# Patient Record
(demographics unavailable — no encounter records)

---

## 2024-11-22 NOTE — SURGICAL HISTORY
[TextEntry] : 12/12/23 right breast lumpectomy  10/24/23 right breast lumpectomy angiogram in 2013

## 2024-11-22 NOTE — HISTORY OF PRESENT ILLNESS
[FreeTextEntry1] : 78 year old female, patient of Dr. Hernandez, presents for follow up evaluation regarding R DCIS ER % [place] 5%. Patient is s/p right breast lumpectomy on 10/24/23 at [facility], path revealed 4.5cm DCIS intermediate grade with necrosis, focally involving a radial scar; LCIS classic type, biopsy site changes; with positive medial margin (DCIS present at inked margin). Patient is s/p re-excision of medial margin 12/12/23 at [facility], path revealed 3.6cm residual DCIS, intermediate grade with necrosis and rare microcalcification; however DCIS focally involving inked new margin on one slide, <1 mm in two slides and tunica media vascular calcifications. Outside slide review complete, no change.  DCISionRT score 9.2. Patient s/p XRT (4/24/24) with rad onc, Dr. Hernandez. Currently taking Tamoxifen with med onc, Dr. Ordonez (initially on Arimidex however d/c d/t osteoporosis).  B/l dMMG/US 11/6/24, BI-RADS 2.  Cancer Hx: First seen on screening mammogram, s/p stereotactic biopsy x 2 sites of R breast, followed by B/L MRI for extent of disease which prompted 2 more biopsies of the R breast. Summary of biopsies below:   8/15/23 - Stereotactic biopsy x 2 sites at [facility] Site A - RIGHT lower central with calcifications: DCIS; intermediate to high nuclear grade, solid and cribriform types; LCIS in situ, classic type; Calcification associated with carcinoma in situ and non-neoplastic tissue Site B - RIGHT lower central without calcifications: DCIS, similar to that in part "A"; portions of papilloma involved by DCIS  9/22/23 - MRI guided biopsy x 2 sites at [facility] Site A - 0.4cm focal NME of the RIGHT 6-7:00 1.3cmfn (cylinder clip) - DCIS, focal, solid pattern, intermediate nuclear grade; [facility] Site B - anterior most aspect of the RIGHT 6:00 segmental 4cm NME (biopsy proven DCIS and LCIS) (buckle clip) - DCIS, focal, solid pattern, intermediate nuclear grade.  Patient denies family history of breast or ovarian cancer.

## 2024-11-22 NOTE — DATA REVIEWED
[FreeTextEntry1] : 7/10/23 ([facility]) B/L sMMG/US: heterogeneously dense. There is an area of developing density/irregular mass with associated microcalcifications in the right breast 12-1:00 position approximately 6 cm deep to nipple. FOLLOW-UP: focal spot compression, ML view and directed breast ultrasound as no abnormality was identified at screening breast ultrasound in this region. BI-RADS 0:  Incomplete.  8/1/23 ([facility]) R DX MMG/US: heterogeneously dense. There is persistent right lower central middle breast focal asymmetry with associated coarse heterogeneous calcifications. Sonographically occult right breast focal asymmetry is indeterminate and tissue sampling is recommended. FOLLOW-UP: Stereotactic/tomosynthesis guided core biopsy of the right breast is recommended. BI-RADS 4:  Suspicious.  8/15/23 Stereotactic biopsy x 2 sites  ([facility])  Site A - RIGHT lower central with calcifications: DCIS; intermediate to high nuclear grade, solid and cribriform types; LCIS in situ, classic type; Calcification associated with carcinoma in situ and non neoplastic tissue Site B - RIGHT lower central without calcifications: DCIS, similar to that in part "A"; portions of papilloma involved by DCIS ER % [place] 5% Malignant, concordant. RECOMMENDATION: Surgical consultation   9/11/23 ([facility]) B/L MRI:  1. RIGHT 6:00 segmental NME measuring up to 4cm in dimension, with the biopsy clip in the posterior and lateral portion of the suspicious enhancement. MRI guided biopsy is recommended of the anterior extent if it would change clinical management 2. Focal NME at RIGHT 6-7:00 anterior depth, 1.7cm anterior to the biopsy-proven DCIS, is suspicious for malignancy. As there is no mammographic correlate, MRI guided biopsy is recommended 3. No suspicious LEFT breast enhancement. No axillay or internal mammary adenopathy.  RECOMMENDATION: Surgical/oncologic management is recommended and ongoing. Biopsy of the RIGHT breast is recommended. BIRADS-4: Suspicious   9/22/23 ([facility]) MRI guided biopsy x 2 sites Site A - 0.4cm focal NME of the RIGHT 6-7:00 1.3cmfn (cylinder clip) - DCIS, focal, solid pattern, intermediate nuclear grade; [facility] Site B - anterior most aspect of the RIGHT 6:00 segmental 4cm NME (biopsy proven DCIS and LCIS) (buckle clip) - DCIS, focal, solid pattern, intermediate nuclear grade. Malignant, concordant. Surgical consultation is ongoing. ADDENDUM: The cylinder and buckle clips are amenable to elvis localization. The tophat clip from outside stereobiopsy is not amenable to elvis.   10/24/23 ([facility])  RIGHT LUMPECTOMY: DCIS, intermediate garde with necrosis, spanning at least 45mm, focally involving a radial scar. LCIS, classic type. Biopsy site changes. RIGHT BREAST LATERAL & SUPERIOR & INFERIOR & DEEP MARGINS: Bengin breast tissue RIGHT BREAST MEIAL MARGIN: DCIS present at the new inked margin RIGHT BREAST ANTERIOR MARGIN: Skin with dermal scar  12/12/23 ([facility])  RE-EXCISION OF RIGHT BREAST MEDIAL MARGIN: Residual DCIS, intermediate grade with necrosis and rare microcalcification, spans approximately 3.6cm.  NEW MARGIN DCIS focally involves inked new margin on one slide, <1mm in two slides. Tunica media vascular calcifications   2/27/24 ([facility]) Outside slide review surg path: Right breast lumpectomy re-excision medial margin: DCIS 3.6 cm intermediate grade, solid type with associated necrosis, present in 1 focus at inked margin and at biopsy site changes, <1mm in two slides, and vascular calcifications.  11/6/24 ([facility]) B/l dMMG/US- No mammographic or sonographic evidence of malignancy. Post lumpectomy changes in the right breast with marked distortion. BI-RADS 2

## 2024-11-22 NOTE — PHYSICAL EXAM
[Supple] : supple [No Supraclavicular Adenopathy] : no supraclavicular adenopathy [Examined in the supine and seated position] : examined in the supine and seated position [No dominant masses] : no dominant masses in right breast  [No dominant masses] : no dominant masses left breast [No Nipple Retraction] : no left nipple retraction [No Nipple Discharge] : no left nipple discharge [No Axillary Lymphadenopathy] : no left axillary lymphadenopathy [de-identified] : anti-radial incision c/d/i; flattened nipple

## 2024-11-22 NOTE — REASON FOR VISIT
[Follow-Up: _____] : a [unfilled] follow-up visit [FreeTextEntry1] : R DCIS s/p lumpectomy s/p re-excision, positive margin

## 2024-11-22 NOTE — PAST MEDICAL HISTORY
[Menarche Age ____] : age at menarche was [unfilled] [History of Hormone Replacement Treatment] : has a history of hormone replacement treatment [Total Preg ___] : G[unfilled] [Live Births ___] : P[unfilled]  [FreeTextEntry2] : miscarriage  [FreeTextEntry6] : No [FreeTextEntry7] : No [FreeTextEntry8] : N/A

## 2024-11-22 NOTE — DATA REVIEWED
[FreeTextEntry1] : 7/10/23 ([facility]) B/L sMMG/US: heterogeneously dense. There is an area of developing density/irregular mass with associated microcalcifications in the right breast 12-1:00 position approximately 6 cm deep to nipple. FOLLOW-UP: focal spot compression, ML view and directed breast ultrasound as no abnormality was identified at screening breast ultrasound in this region. BI-RADS 0:  Incomplete.  8/1/23 ([facility]) R DX MMG/US: heterogeneously dense. There is persistent right lower central middle breast focal asymmetry with associated coarse heterogeneous calcifications. Sonographically occult right breast focal asymmetry is indeterminate and tissue sampling is recommended. FOLLOW-UP: Stereotactic/tomosynthesis guided core biopsy of the right breast is recommended. BI-RADS 4:  Suspicious.  8/15/23 Stereotactic biopsy x 2 sites  ([facility])  Site A - RIGHT lower central with calcifications: DCIS; intermediate to high nuclear grade, solid and cribriform types; LCIS in situ, classic type; Calcification associated with carcinoma in situ and non neoplastic tissue Site B - RIGHT lower central without calcifications: DCIS, similar to that in part "A"; portions of papilloma involved by DCIS ER % [place] 5% Malignant, concordant. RECOMMENDATION: Surgical consultation   9/11/23 ([facility]) B/L MRI:  1. RIGHT 6:00 segmental NME measuring up to 4cm in dimension, with the biopsy clip in the posterior and lateral portion of the suspicious enhancement. MRI guided biopsy is recommended of the anterior extent if it would change clinical management 2. Focal NME at RIGHT 6-7:00 anterior depth, 1.7cm anterior to the biopsy-proven DCIS, is suspicious for malignancy. As there is no mammographic correlate, MRI guided biopsy is recommended 3. No suspicious LEFT breast enhancement. No axillay or internal mammary adenopathy.  RECOMMENDATION: Surgical/oncologic management is recommended and ongoing. Biopsy of the RIGHT breast is recommended. BIRADS-4: Suspicious   9/22/23 ([facility]) MRI guided biopsy x 2 sites Site A - 0.4cm focal NME of the RIGHT 6-7:00 1.3cmfn (cylinder clip) - DCIS, focal, solid pattern, intermediate nuclear grade; [facility] Site B - anterior most aspect of the RIGHT 6:00 segmental 4cm NME (biopsy proven DCIS and LCIS) (buckle clip) - DCIS, focal, solid pattern, intermediate nuclear grade. Malignant, concordant. Surgical consultation is ongoing. ADDENDUM: The cylinder and buckle clips are amenable to elvis localization. The tophat clip from outside stereobiopsy is not amenable to elvis.   10/24/23 ([facility])  RIGHT LUMPECTOMY: DCIS, intermediate garde with necrosis, spanning at least 45mm, focally involving a radial scar. LCIS, classic type. Biopsy site changes. RIGHT BREAST LATERAL & SUPERIOR & INFERIOR & DEEP MARGINS: Bengin breast tissue RIGHT BREAST MEIAL MARGIN: DCIS present at the new inked margin RIGHT BREAST ANTERIOR MARGIN: Skin with dermal scar  12/12/23 ([facility])  RE-EXCISION OF RIGHT BREAST MEDIAL MARGIN: Residual DCIS, intermediate grade with necrosis and rare microcalcification, spans approximately 3.6cm.  NEW MARGIN DCIS focally involves inked new margin on one slide, <1mm in two slides. Tunica media vascular calcifications   2/27/24 ([facility]) Outside slide review surg path: Right breast lumpectomy re-excision medial margin: DCIS 3.6 cm intermediate grade, solid type with associated necrosis, present in 1 focus at inked margin and at biopsy site changes, <1mm in two slides, and vascular calcifications.  11/6/24 ([facility]) B/l dMMG/US- No mammographic or sonographic evidence of malignancy. Post lumpectomy changes in the right breast with marked distortion. BI-RADS 2

## 2024-11-22 NOTE — PHYSICAL EXAM
[Supple] : supple [No Supraclavicular Adenopathy] : no supraclavicular adenopathy [Examined in the supine and seated position] : examined in the supine and seated position [No dominant masses] : no dominant masses in right breast  [No dominant masses] : no dominant masses left breast [No Nipple Retraction] : no left nipple retraction [No Nipple Discharge] : no left nipple discharge [No Axillary Lymphadenopathy] : no left axillary lymphadenopathy [de-identified] : anti-radial incision c/d/i; flattened nipple

## 2024-11-22 NOTE — ASSESSMENT
[FreeTextEntry1] : 78 year old female presents for initial evaluation regarding 2nd opinion for R DCIS ER % [place] 5%. Patient is s/p right breast lumpectomy 10/24/23 at [facility], path revealed 4.5cm DCIS intermediate grade with necrosis, focally involving a radial scar; LCIS classic type, biopsy site changes; with positive medial margin (DCIS present at inked margin). Patient is s/p re-excision of medial margin 12/12/23 at [facility], path revealed 3.6cm residual DCIS, intermediate grade with necrosis and rare microcalcification; however DCIS focally involving inked new margin on one slide, <1 mm in two slides and tunica media vascular calcifications.   First seen on screening mammogram, s/p stereotactic biopsy x 2 sites of R breast, followed by B/L MRI for extent of disease which prompted 2 more biopsies of the R breast.  Slide review of surgical specimen from re-excision at [facility] now completed with no change. DCISionRT score 9.2. S/p XRT, currently on Tamoxifen.  Physical exam WNL. Patient without complaints. Most recent imaging reviewed. Discussed merit in obtaining a repeat MRI in 6 months given the positive margins on final surgical path and the cancer previously seen on MRI with subsequent MR-guided biopsies. Advised patient continue follow up with med onc, Dr. Ordonez. Plan for B/L MRI and re-examination in 6 months. All patients questions were answered to their satisfaction. Patient verbalizes understanding and agreement with the plan.

## 2024-11-22 NOTE — HISTORY OF PRESENT ILLNESS
[FreeTextEntry1] : 78 year old female, patient of Dr. Hernandez, presents for follow up evaluation regarding R DCIS ER % [place] 5%. Patient is s/p right breast lumpectomy on 10/24/23 at [facility], path revealed 4.5cm DCIS intermediate grade with necrosis, focally involving a radial scar; LCIS classic type, biopsy site changes; with positive medial margin (DCIS present at inked margin). Patient is s/p re-excision of medial margin 12/12/23 at [facility], path revealed 3.6cm residual DCIS, intermediate grade with necrosis and rare microcalcification; however DCIS focally involving inked new margin on one slide, <1 mm in two slides and tunica media vascular calcifications. Outside slide review complete, no change.  DCISionRT score 9.2. Patient s/p XRT (4/24/24) with rad onc, Dr. Hernandez. Currently taking Tamoxifen with med onc, Dr. Ordonez (initially on Arimidex however d/c d/t osteoporosis).  B/l dMMG/US 11/6/24, BI-RADS 2.  Cancer Hx: First seen on screening mammogram, s/p stereotactic biopsy x 2 sites of R breast, followed by B/L MRI for extent of disease which prompted 2 more biopsies of the R breast. Summary of biopsies below:   8/15/23 - Stereotactic biopsy x 2 sites at [facility] Site A - RIGHT lower central with calcifications: DCIS; intermediate to high nuclear grade, solid and cribriform types; LCIS in situ, classic type; Calcification associated with carcinoma in situ and non-neoplastic tissue Site B - RIGHT lower central without calcifications: DCIS, similar to that in part "A"; portions of papilloma involved by DCIS  9/22/23 - MRI guided biopsy x 2 sites at [facility] Site A - 0.4cm focal NME of the RIGHT 6-7:00 1.3cmfn (cylinder clip) - DCIS, focal, solid pattern, intermediate nuclear grade; [facility] Site B - anterior most aspect of the RIGHT 6:00 segmental 4cm NME (biopsy proven DCIS and LCIS) (buckle clip) - DCIS, focal, solid pattern, intermediate nuclear grade.  Patient denies family history of breast or ovarian cancer.

## 2024-11-22 NOTE — DATA REVIEWED
[FreeTextEntry1] : 7/10/23 ([facility]) B/L sMMG/US: heterogeneously dense. There is an area of developing density/irregular mass with associated microcalcifications in the right breast 12-1:00 position approximately 6 cm deep to nipple. FOLLOW-UP: focal spot compression, ML view and directed breast ultrasound as no abnormality was identified at screening breast ultrasound in this region. BI-RADS 0:  Incomplete.  8/1/23 ([facility]) R DX MMG/US: heterogeneously dense. There is persistent right lower central middle breast focal asymmetry with associated coarse heterogeneous calcifications. Sonographically occult right breast focal asymmetry is indeterminate and tissue sampling is recommended. FOLLOW-UP: Stereotactic/tomosynthesis guided core biopsy of the right breast is recommended. BI-RADS 4:  Suspicious.  8/15/23 Stereotactic biopsy x 2 sites  ([facility])  Site A - RIGHT lower central with calcifications: DCIS; intermediate to high nuclear grade, solid and cribriform types; LCIS in situ, classic type; Calcification associated with carcinoma in situ and non neoplastic tissue Site B - RIGHT lower central without calcifications: DCIS, similar to that in part "A"; portions of papilloma involved by DCIS ER % [place] 5% Malignant, concordant. RECOMMENDATION: Surgical consultation   9/11/23 ([facility]) B/L MRI:  1. RIGHT 6:00 segmental NME measuring up to 4cm in dimension, with the biopsy clip in the posterior and lateral portion of the suspicious enhancement. MRI guided biopsy is recommended of the anterior extent if it would change clinical management 2. Focal NME at RIGHT 6-7:00 anterior depth, 1.7cm anterior to the biopsy-proven DCIS, is suspicious for malignancy. As there is no mammographic correlate, MRI guided biopsy is recommended 3. No suspicious LEFT breast enhancement. No axillay or internal mammary adenopathy.  RECOMMENDATION: Surgical/oncologic management is recommended and ongoing. Biopsy of the RIGHT breast is recommended. BIRADS-4: Suspicious   9/22/23 ([facility]) MRI guided biopsy x 2 sites Site A - 0.4cm focal NME of the RIGHT 6-7:00 1.3cmfn (cylinder clip) - DCIS, focal, solid pattern, intermediate nuclear grade; [facility] Site B - anterior most aspect of the RIGHT 6:00 segmental 4cm NME (biopsy proven DCIS and LCIS) (buckle clip) - DCIS, focal, solid pattern, intermediate nuclear grade. Malignant, concordant. Surgical consultation is ongoing. ADDENDUM: The cylinder and buckle clips are amenable to elvis localization. The tophat clip from outside stereobiopsy is not amenable to elvis.   10/24/23 ([facility])  RIGHT LUMPECTOMY: DCIS, intermediate garde with necrosis, spanning at least 45mm, focally involving a radial scar. LCIS, classic type. Biopsy site changes. RIGHT BREAST LATERAL & SUPERIOR & INFERIOR & DEEP MARGINS: Bengin breast tissue RIGHT BREAST MEIAL MARGIN: DCIS present at the new inked margin RIGHT BREAST ANTERIOR MARGIN: Skin with dermal scar  12/12/23 ([facility])  RE-EXCISION OF RIGHT BREAST MEDIAL MARGIN: Residual DCIS, intermediate grade with necrosis and rare microcalcification, spans approximately 3.6cm.  NEW MARGIN DCIS focally involves inked new margin on one slide, <1mm in two slides. Tunica media vascular calcifications   2/27/24 ([facility]) Outside slide review surg path: Right breast lumpectomy re-excision medial margin: DCIS 3.6 cm intermediate grade, solid type with associated necrosis, present in 1 focus at inked margin and at biopsy site changes, <1mm in two slides, and vascular calcifications.  11/6/24 ([facility]) B/l dMMG/US- No mammographic or sonographic evidence of malignancy. Post lumpectomy changes in the right breast with marked distortion. BI-RADS 2

## 2024-11-22 NOTE — FAMILY HISTORY
[TextEntry] : Paternal cousin throat cancer DOD age 50's  Denies any family history of breast, ovarian, pancreatic, prostate or melanoma.

## 2024-11-22 NOTE — ASSESSMENT
[FreeTextEntry1] : 78 year old female presents for initial evaluation regarding 2nd opinion for R DCIS ER % [place] 5%. Patient is s/p right breast lumpectomy 10/24/23 at [facility], path revealed 4.5cm DCIS intermediate grade with necrosis, focally involving a radial scar; LCIS classic type, biopsy site changes; with positive medial margin (DCIS present at inked margin). Patient is s/p re-excision of medial margin 12/12/23 at [facility], path revealed 3.6cm residual DCIS, intermediate grade with necrosis and rare microcalcification; however DCIS focally involving inked new margin on one slide, <1 mm in two slides and tunica media vascular calcifications.   First seen on screening mammogram, s/p stereotactic biopsy x 2 sites of R breast, followed by B/L MRI for extent of disease which prompted 2 more biopsies of the R breast.  Slide review of surgical specimen from re-excision at [facility] now completed with no change. DCISionRT score 9.2. S/p XRT, currently on Tamoxifen.  Physical exam WNL. Patient without complaints. Most recent imaging reviewed. Discussed merit in obtaining a repeat MRI in 6 months given the positive margins on final surgical path and the cancer previously seen on MRI with subsequent MR-guided biopsies. Advised patient continue follow up with med onc, Dr. Ordonez. Plan for B/L MRI and re-examination in 6 months. All patients questions were answered to their satisfaction. Patient verbalizes understanding and agreement with the plan.

## 2024-11-22 NOTE — PHYSICAL EXAM
[Supple] : supple [No Supraclavicular Adenopathy] : no supraclavicular adenopathy [Examined in the supine and seated position] : examined in the supine and seated position [No dominant masses] : no dominant masses in right breast  [No dominant masses] : no dominant masses left breast [No Nipple Retraction] : no left nipple retraction [No Nipple Discharge] : no left nipple discharge [No Axillary Lymphadenopathy] : no left axillary lymphadenopathy [de-identified] : anti-radial incision c/d/i; flattened nipple

## 2024-11-22 NOTE — HISTORY OF PRESENT ILLNESS
[FreeTextEntry1] : 78 year old female, patient of Dr. Hernandez, presents for follow up evaluation regarding R DCIS ER % [place] 5%. Patient is s/p right breast lumpectomy on 10/24/23 at [facility], path revealed 4.5cm DCIS intermediate grade with necrosis, focally involving a radial scar; LCIS classic type, biopsy site changes; with positive medial margin (DCIS present at inked margin). Patient is s/p re-excision of medial margin 12/12/23 at [facility], path revealed 3.6cm residual DCIS, intermediate grade with necrosis and rare microcalcification; however DCIS focally involving inked new margin on one slide, <1 mm in two slides and tunica media vascular calcifications. Outside slide review complete, no change.  DCISionRT score 9.2. Patient s/p XRT (4/24/24) with rad onc, Dr. Hernandez. Currently taking Tamoxifen with med onc, Dr. Ordonez (initially on Arimidex however d/c d/t osteoporosis).  B/l dMMG/US 11/6/24, BI-RADS 2.  Cancer Hx: First seen on screening mammogram, s/p stereotactic biopsy x 2 sites of R breast, followed by B/L MRI for extent of disease which prompted 2 more biopsies of the R breast. Summary of biopsies below:   8/15/23 - Stereotactic biopsy x 2 sites at [facility] Site A - RIGHT lower central with calcifications: DCIS; intermediate to high nuclear grade, solid and cribriform types; LCIS in situ, classic type; Calcification associated with carcinoma in situ and non-neoplastic tissue Site B - RIGHT lower central without calcifications: DCIS, similar to that in part "A"; portions of papilloma involved by DCIS  9/22/23 - MRI guided biopsy x 2 sites at [facility] Site A - 0.4cm focal NME of the RIGHT 6-7:00 1.3cmfn (cylinder clip) - DCIS, focal, solid pattern, intermediate nuclear grade; [facility] Site B - anterior most aspect of the RIGHT 6:00 segmental 4cm NME (biopsy proven DCIS and LCIS) (buckle clip) - DCIS, focal, solid pattern, intermediate nuclear grade.  Patient denies family history of breast or ovarian cancer.

## 2024-11-22 NOTE — ASSESSMENT
[FreeTextEntry1] : 78 year old female presents for initial evaluation regarding 2nd opinion for R DCIS ER % [place] 5%. Patient is s/p right breast lumpectomy 10/24/23 at [facility], path revealed 4.5cm DCIS intermediate grade with necrosis, focally involving a radial scar; LCIS classic type, biopsy site changes; with positive medial margin (DCIS present at inked margin). Patient is s/p re-excision of medial margin 12/12/23 at [facility], path revealed 3.6cm residual DCIS, intermediate grade with necrosis and rare microcalcification; however DCIS focally involving inked new margin on one slide, <1 mm in two slides and tunica media vascular calcifications.   First seen on screening mammogram, s/p stereotactic biopsy x 2 sites of R breast, followed by B/L MRI for extent of disease which prompted 2 more biopsies of the R breast.  Slide review of surgical specimen from re-excision at [facility] now completed with no change. DCISionRT score 9.2. S/p XRT, currently on Tamoxifen.  Physical exam WNL. Patient without complaints. Most recent imaging reviewed. Discussed merit in obtaining a repeat MRI in 6 months given the positive margins on final surgical path and the cancer previously seen on MRI with subsequent MR-guided biopsies. Advised patient continue follow up with med onc, Dr. Ordonez. Plan for B/L MRI and re-examination in 6 months. All patients questions were answered to their satisfaction. Patient verbalizes understanding and agreement with the plan.

## 2025-02-03 NOTE — HISTORY OF PRESENT ILLNESS
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 Dr. Beyer discussed another mammogram (but pt deferred because she felt it was too soon after surgery) and DCISionRT, as well as possible options of re-excision with WBRT, mastectomy, and XRT only with boost. She proceeded with adjuvant RT 5640cGy/23fx to right breast (completed 4/24/24).  She was under the care of Dr. Sullivan who recommended Arimidex but she did not start it due to concern of potential adverse effects impacting her quality of life.  She has a history of osteoporosis, on Prolia with her endocrinologist.  She was started on tamoxifen on 8/22/24 but stopped it due to pelvic pain.  Patient denies family history of breast or ovarian cancer.  First seen on screening mammogram.  7/10/23 ([facility]) B/L sMMG/US: heterogeneously dense. There is an area of developing density/irregular mass with associated microcalcifications in the right breast 12-1:00 position approximately 6 cm deep to nipple. FOLLOW-UP: focal spot compression, ML view and directed breast ultrasound as no abnormality was identified at screening breast ultrasound in this region. BI-RADS 0: Incomplete.  8/1/23 ([facility]) R DX MMG/US: heterogeneously dense. There is persistent right lower central middle breast focal asymmetry with associated coarse heterogeneous calcifications. Sonographically occult right breast focal asymmetry is indeterminate and tissue sampling is recommended. FOLLOW-UP: Stereotactic/tomosynthesis guided core biopsy of the right breast is recommended. BI-RADS 4: Suspicious.  8/15/23 Stereotactic biopsy x 2 sites ([facility]) Site A - RIGHT lower central with calcifications: DCIS; intermediate to high nuclear grade, solid and cribriform types; LCIS in situ, classic type; Calcification associated with carcinoma in situ and non neoplastic tissue Site B - RIGHT lower central without calcifications: DCIS, similar to that in part "A"; portions of papilloma involved by DCIS ER % [place] 5% Malignant, concordant.   9/11/23 ([facility]) B/L MRI: 1. RIGHT 6:00 segmental NME measuring up to 4cm in dimension, with the biopsy clip in the posterior and lateral portion of the suspicious enhancement. MRI guided biopsy is recommended of the anterior extent if it would change clinical management 2. Focal NME at RIGHT 6-7:00 anterior depth, 1.7cm anterior to the biopsy-proven DCIS, is suspicious for malignancy. As there is no mammographic correlate, MRI guided biopsy is recommended 3. No suspicious LEFT breast enhancement. No axillary or internal mammary adenopathy.  9/22/23 ([facility]) MRI guided biopsy x 2 sites Site A - 0.4cm focal NME of the RIGHT 6-7:00 1.3cmfn (cylinder clip) - DCIS, focal, solid pattern, intermediate nuclear grade; IDP Site B - anterior most aspect of the RIGHT 6:00 segmental 4cm NME (biopsy proven DCIS and LCIS) (buckle clip) - DCIS, focal, solid pattern, intermediate nuclear grade. Malignant, concordant. Surgical consultation is ongoing. ADDENDUM: The cylinder and buckle clips are amenable to elvis localization. The tophat clip from outside stereobiopsy is not amenable to elvis.  10/24/23 ([facility]) RIGHT LUMPECTOMY: DCIS, intermediate grade with necrosis, spanning at least 45mm, focally involving a radial scar. LCIS, classic type. Biopsy site changes. RIGHT BREAST LATERAL & SUPERIOR & INFERIOR & DEEP MARGINS: Benign breast tissue RIGHT BREAST MEIAL MARGIN: DCIS present at the new inked margin RIGHT BREAST ANTERIOR MARGIN: Skin with dermal scar  12/12/23 ([facility]) RE-EXCISION OF RIGHT BREAST MEDIAL MARGIN: Residual DCIS, intermediate grade with necrosis and rare microcalcification, spans approximately 3.6cm. NEW MARGIN DCIS focally involves inked new margin on one slide, <1mm in two slides. Tunica media vascular calcifications.  11/6/24: US Breast No mammographic or sonographic evidence of malignancy. Post lumpectomy changes in the right breast with marked distortion 11/6/24: Mammogram: The breasts are heterogeneously dense, which may obscure small masses.  No suspicious mass, suspicious microcalcifications, or other sign of malignancy is identified. Post lumpectomy changes in the right breast with scarring and distortion behind the nipple.  [de-identified] : ductal carcinoma in situ [de-identified] : ER % [place] 5%

## 2025-02-03 NOTE — PHYSICAL EXAM
[Normal] : affect appropriate [de-identified] : right breast post-lumpectomy. no mass palpated bilaterally

## 2025-02-03 NOTE — PHYSICAL EXAM
[Normal] : affect appropriate [de-identified] : right breast post-lumpectomy. no mass palpated bilaterally

## 2025-02-03 NOTE — PHYSICAL EXAM
[Normal] : affect appropriate [de-identified] : right breast post-lumpectomy. no mass palpated bilaterally

## 2025-02-03 NOTE — HISTORY OF PRESENT ILLNESS
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 Dr. Beyer discussed another mammogram (but pt deferred because she felt it was too soon after surgery) and DCISionRT, as well as possible options of re-excision with WBRT, mastectomy, and XRT only with boost. She proceeded with adjuvant RT 5640cGy/23fx to right breast (completed 4/24/24).  She was under the care of Dr. Sullivan who recommended Arimidex but she did not start it due to concern of potential adverse effects impacting her quality of life.  She has a history of osteoporosis, on Prolia with her endocrinologist.  She was started on tamoxifen on 8/22/24 but stopped it due to pelvic pain.  Patient denies family history of breast or ovarian cancer.  First seen on screening mammogram.  7/10/23 ([facility]) B/L sMMG/US: heterogeneously dense. There is an area of developing density/irregular mass with associated microcalcifications in the right breast 12-1:00 position approximately 6 cm deep to nipple. FOLLOW-UP: focal spot compression, ML view and directed breast ultrasound as no abnormality was identified at screening breast ultrasound in this region. BI-RADS 0: Incomplete.  8/1/23 ([facility]) R DX MMG/US: heterogeneously dense. There is persistent right lower central middle breast focal asymmetry with associated coarse heterogeneous calcifications. Sonographically occult right breast focal asymmetry is indeterminate and tissue sampling is recommended. FOLLOW-UP: Stereotactic/tomosynthesis guided core biopsy of the right breast is recommended. BI-RADS 4: Suspicious.  8/15/23 Stereotactic biopsy x 2 sites ([facility]) Site A - RIGHT lower central with calcifications: DCIS; intermediate to high nuclear grade, solid and cribriform types; LCIS in situ, classic type; Calcification associated with carcinoma in situ and non neoplastic tissue Site B - RIGHT lower central without calcifications: DCIS, similar to that in part "A"; portions of papilloma involved by DCIS ER % [place] 5% Malignant, concordant.   9/11/23 ([facility]) B/L MRI: 1. RIGHT 6:00 segmental NME measuring up to 4cm in dimension, with the biopsy clip in the posterior and lateral portion of the suspicious enhancement. MRI guided biopsy is recommended of the anterior extent if it would change clinical management 2. Focal NME at RIGHT 6-7:00 anterior depth, 1.7cm anterior to the biopsy-proven DCIS, is suspicious for malignancy. As there is no mammographic correlate, MRI guided biopsy is recommended 3. No suspicious LEFT breast enhancement. No axillary or internal mammary adenopathy.  9/22/23 ([facility]) MRI guided biopsy x 2 sites Site A - 0.4cm focal NME of the RIGHT 6-7:00 1.3cmfn (cylinder clip) - DCIS, focal, solid pattern, intermediate nuclear grade; IDP Site B - anterior most aspect of the RIGHT 6:00 segmental 4cm NME (biopsy proven DCIS and LCIS) (buckle clip) - DCIS, focal, solid pattern, intermediate nuclear grade. Malignant, concordant. Surgical consultation is ongoing. ADDENDUM: The cylinder and buckle clips are amenable to elvis localization. The tophat clip from outside stereobiopsy is not amenable to elvis.  10/24/23 ([facility]) RIGHT LUMPECTOMY: DCIS, intermediate grade with necrosis, spanning at least 45mm, focally involving a radial scar. LCIS, classic type. Biopsy site changes. RIGHT BREAST LATERAL & SUPERIOR & INFERIOR & DEEP MARGINS: Benign breast tissue RIGHT BREAST MEIAL MARGIN: DCIS present at the new inked margin RIGHT BREAST ANTERIOR MARGIN: Skin with dermal scar  12/12/23 ([facility]) RE-EXCISION OF RIGHT BREAST MEDIAL MARGIN: Residual DCIS, intermediate grade with necrosis and rare microcalcification, spans approximately 3.6cm. NEW MARGIN DCIS focally involves inked new margin on one slide, <1mm in two slides. Tunica media vascular calcifications.  11/6/24: US Breast No mammographic or sonographic evidence of malignancy. Post lumpectomy changes in the right breast with marked distortion 11/6/24: Mammogram: The breasts are heterogeneously dense, which may obscure small masses.  No suspicious mass, suspicious microcalcifications, or other sign of malignancy is identified. Post lumpectomy changes in the right breast with scarring and distortion behind the nipple.  [de-identified] : ductal carcinoma in situ [de-identified] : ER % [place] 5%

## 2025-02-03 NOTE — HISTORY OF PRESENT ILLNESS
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 Dr. Beyer discussed another mammogram (but pt deferred because she felt it was too soon after surgery) and DCISionRT, as well as possible options of re-excision with WBRT, mastectomy, and XRT only with boost. She proceeded with adjuvant RT 5640cGy/23fx to right breast (completed 4/24/24).  She was under the care of Dr. Sullivan who recommended Arimidex but she did not start it due to concern of potential adverse effects impacting her quality of life.  She has a history of osteoporosis, on Prolia with her endocrinologist.  She was started on tamoxifen on 8/22/24 but stopped it due to pelvic pain.  Patient denies family history of breast or ovarian cancer.  First seen on screening mammogram.  7/10/23 ([facility]) B/L sMMG/US: heterogeneously dense. There is an area of developing density/irregular mass with associated microcalcifications in the right breast 12-1:00 position approximately 6 cm deep to nipple. FOLLOW-UP: focal spot compression, ML view and directed breast ultrasound as no abnormality was identified at screening breast ultrasound in this region. BI-RADS 0: Incomplete.  8/1/23 ([facility]) R DX MMG/US: heterogeneously dense. There is persistent right lower central middle breast focal asymmetry with associated coarse heterogeneous calcifications. Sonographically occult right breast focal asymmetry is indeterminate and tissue sampling is recommended. FOLLOW-UP: Stereotactic/tomosynthesis guided core biopsy of the right breast is recommended. BI-RADS 4: Suspicious.  8/15/23 Stereotactic biopsy x 2 sites ([facility]) Site A - RIGHT lower central with calcifications: DCIS; intermediate to high nuclear grade, solid and cribriform types; LCIS in situ, classic type; Calcification associated with carcinoma in situ and non neoplastic tissue Site B - RIGHT lower central without calcifications: DCIS, similar to that in part "A"; portions of papilloma involved by DCIS ER % [place] 5% Malignant, concordant.   9/11/23 ([facility]) B/L MRI: 1. RIGHT 6:00 segmental NME measuring up to 4cm in dimension, with the biopsy clip in the posterior and lateral portion of the suspicious enhancement. MRI guided biopsy is recommended of the anterior extent if it would change clinical management 2. Focal NME at RIGHT 6-7:00 anterior depth, 1.7cm anterior to the biopsy-proven DCIS, is suspicious for malignancy. As there is no mammographic correlate, MRI guided biopsy is recommended 3. No suspicious LEFT breast enhancement. No axillary or internal mammary adenopathy.  9/22/23 ([facility]) MRI guided biopsy x 2 sites Site A - 0.4cm focal NME of the RIGHT 6-7:00 1.3cmfn (cylinder clip) - DCIS, focal, solid pattern, intermediate nuclear grade; IDP Site B - anterior most aspect of the RIGHT 6:00 segmental 4cm NME (biopsy proven DCIS and LCIS) (buckle clip) - DCIS, focal, solid pattern, intermediate nuclear grade. Malignant, concordant. Surgical consultation is ongoing. ADDENDUM: The cylinder and buckle clips are amenable to elvis localization. The tophat clip from outside stereobiopsy is not amenable to elvis.  10/24/23 ([facility]) RIGHT LUMPECTOMY: DCIS, intermediate grade with necrosis, spanning at least 45mm, focally involving a radial scar. LCIS, classic type. Biopsy site changes. RIGHT BREAST LATERAL & SUPERIOR & INFERIOR & DEEP MARGINS: Benign breast tissue RIGHT BREAST MEIAL MARGIN: DCIS present at the new inked margin RIGHT BREAST ANTERIOR MARGIN: Skin with dermal scar  12/12/23 ([facility]) RE-EXCISION OF RIGHT BREAST MEDIAL MARGIN: Residual DCIS, intermediate grade with necrosis and rare microcalcification, spans approximately 3.6cm. NEW MARGIN DCIS focally involves inked new margin on one slide, <1mm in two slides. Tunica media vascular calcifications.  11/6/24: US Breast No mammographic or sonographic evidence of malignancy. Post lumpectomy changes in the right breast with marked distortion 11/6/24: Mammogram: The breasts are heterogeneously dense, which may obscure small masses.  No suspicious mass, suspicious microcalcifications, or other sign of malignancy is identified. Post lumpectomy changes in the right breast with scarring and distortion behind the nipple.  [de-identified] : ductal carcinoma in situ [de-identified] : ER % [place] 5%

## 2025-02-03 NOTE — REVIEW OF SYSTEMS
[Constipation] : constipation [Insomnia] : insomnia [Negative] : Heme/Lymph [FreeTextEntry7] : chronic constipation

## 2025-02-03 NOTE — PHYSICAL EXAM
[Normal] : affect appropriate [de-identified] : right breast post-lumpectomy. no mass palpated bilaterally

## 2025-02-03 NOTE — HISTORY OF PRESENT ILLNESS
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 Dr. Beyer discussed another mammogram (but pt deferred because she felt it was too soon after surgery) and DCISionRT, as well as possible options of re-excision with WBRT, mastectomy, and XRT only with boost. She proceeded with adjuvant RT 5640cGy/23fx to right breast (completed 4/24/24).  She was under the care of Dr. Sullivan who recommended Arimidex but she did not start it due to concern of potential adverse effects impacting her quality of life.  She has a history of osteoporosis, on Prolia with her endocrinologist.  She was started on tamoxifen on 8/22/24 but stopped it due to pelvic pain.  Patient denies family history of breast or ovarian cancer.  First seen on screening mammogram.  7/10/23 ([facility]) B/L sMMG/US: heterogeneously dense. There is an area of developing density/irregular mass with associated microcalcifications in the right breast 12-1:00 position approximately 6 cm deep to nipple. FOLLOW-UP: focal spot compression, ML view and directed breast ultrasound as no abnormality was identified at screening breast ultrasound in this region. BI-RADS 0: Incomplete.  8/1/23 ([facility]) R DX MMG/US: heterogeneously dense. There is persistent right lower central middle breast focal asymmetry with associated coarse heterogeneous calcifications. Sonographically occult right breast focal asymmetry is indeterminate and tissue sampling is recommended. FOLLOW-UP: Stereotactic/tomosynthesis guided core biopsy of the right breast is recommended. BI-RADS 4: Suspicious.  8/15/23 Stereotactic biopsy x 2 sites ([facility]) Site A - RIGHT lower central with calcifications: DCIS; intermediate to high nuclear grade, solid and cribriform types; LCIS in situ, classic type; Calcification associated with carcinoma in situ and non neoplastic tissue Site B - RIGHT lower central without calcifications: DCIS, similar to that in part "A"; portions of papilloma involved by DCIS ER % [place] 5% Malignant, concordant.   9/11/23 ([facility]) B/L MRI: 1. RIGHT 6:00 segmental NME measuring up to 4cm in dimension, with the biopsy clip in the posterior and lateral portion of the suspicious enhancement. MRI guided biopsy is recommended of the anterior extent if it would change clinical management 2. Focal NME at RIGHT 6-7:00 anterior depth, 1.7cm anterior to the biopsy-proven DCIS, is suspicious for malignancy. As there is no mammographic correlate, MRI guided biopsy is recommended 3. No suspicious LEFT breast enhancement. No axillary or internal mammary adenopathy.  9/22/23 ([facility]) MRI guided biopsy x 2 sites Site A - 0.4cm focal NME of the RIGHT 6-7:00 1.3cmfn (cylinder clip) - DCIS, focal, solid pattern, intermediate nuclear grade; IDP Site B - anterior most aspect of the RIGHT 6:00 segmental 4cm NME (biopsy proven DCIS and LCIS) (buckle clip) - DCIS, focal, solid pattern, intermediate nuclear grade. Malignant, concordant. Surgical consultation is ongoing. ADDENDUM: The cylinder and buckle clips are amenable to elvis localization. The tophat clip from outside stereobiopsy is not amenable to elvis.  10/24/23 ([facility]) RIGHT LUMPECTOMY: DCIS, intermediate grade with necrosis, spanning at least 45mm, focally involving a radial scar. LCIS, classic type. Biopsy site changes. RIGHT BREAST LATERAL & SUPERIOR & INFERIOR & DEEP MARGINS: Benign breast tissue RIGHT BREAST MEIAL MARGIN: DCIS present at the new inked margin RIGHT BREAST ANTERIOR MARGIN: Skin with dermal scar  12/12/23 ([facility]) RE-EXCISION OF RIGHT BREAST MEDIAL MARGIN: Residual DCIS, intermediate grade with necrosis and rare microcalcification, spans approximately 3.6cm. NEW MARGIN DCIS focally involves inked new margin on one slide, <1mm in two slides. Tunica media vascular calcifications.  11/6/24: US Breast No mammographic or sonographic evidence of malignancy. Post lumpectomy changes in the right breast with marked distortion 11/6/24: Mammogram: The breasts are heterogeneously dense, which may obscure small masses.  No suspicious mass, suspicious microcalcifications, or other sign of malignancy is identified. Post lumpectomy changes in the right breast with scarring and distortion behind the nipple.  [de-identified] : ductal carcinoma in situ [de-identified] : ER % [place] 5%

## 2025-06-04 NOTE — ASSESSMENT
[FreeTextEntry1] : 79 year old female, patient of Dr. Hernandez, presents for follow up evaluation regarding R DCIS ER % [place] 5%. Patient is s/p right breast lumpectomy on 10/24/23 at [facility], path revealed 4.5cm DCIS intermediate grade with necrosis, focally involving a radial scar; LCIS classic type, biopsy site changes; with positive medial margin (DCIS present at inked margin). Patient is s/p re-excision of medial margin 12/12/23 at [facility], path revealed 3.6cm residual DCIS, intermediate grade with necrosis and rare microcalcification; however DCIS focally involving inked new margin on one slide, <1 mm in two slides and tunica media vascular calcifications. Outside slide review complete, no change.  Currently taking Tamoxifen with med onc, Dr. Ordonez (initially on Arimidex however d/c d/t osteoporosis).  B/l dMMG/US 11/6/24, BI-RADS 2. B/l MRI 5/27/25 BI-RADS 3, postsurigcal and adjuvant radiation changes noted in the right breast -- given patient's history of prior surgical excision with positive margins, recommend continued MRI surveillance in 6 months.  Patient without complaint. Physical exam WNL. Most recent imaging reviewed, BI-RADS 3. Plan for B/L MRI and B/l sMMG/US and re-examination in 6 months. Patient verbalizes understanding and is in agreement with the plan.

## 2025-06-04 NOTE — DATA REVIEWED
[FreeTextEntry1] : 7/10/23 ([facility]) B/L sMMG/US: heterogeneously dense. There is an area of developing density/irregular mass with associated microcalcifications in the right breast 12-1:00 position approximately 6 cm deep to nipple. FOLLOW-UP: focal spot compression, ML view and directed breast ultrasound as no abnormality was identified at screening breast ultrasound in this region. BI-RADS 0:  Incomplete.  8/1/23 ([facility]) R DX MMG/US: heterogeneously dense. There is persistent right lower central middle breast focal asymmetry with associated coarse heterogeneous calcifications. Sonographically occult right breast focal asymmetry is indeterminate and tissue sampling is recommended. FOLLOW-UP: Stereotactic/tomosynthesis guided core biopsy of the right breast is recommended. BI-RADS 4:  Suspicious.  8/15/23 Stereotactic biopsy x 2 sites  ([facility])  Site A - RIGHT lower central with calcifications: DCIS; intermediate to high nuclear grade, solid and cribriform types; LCIS in situ, classic type; Calcification associated with carcinoma in situ and non neoplastic tissue Site B - RIGHT lower central without calcifications: DCIS, similar to that in part "A"; portions of papilloma involved by DCIS ER % WI 5% Malignant, concordant. RECOMMENDATION: Surgical consultation   9/11/23 ([facility]) B/L MRI:  1. RIGHT 6:00 segmental NME measuring up to 4cm in dimension, with the biopsy clip in the posterior and lateral portion of the suspicious enhancement. MRI guided biopsy is recommended of the anterior extent if it would change clinical management 2. Focal NME at RIGHT 6-7:00 anterior depth, 1.7cm anterior to the biopsy-proven DCIS, is suspicious for malignancy. As there is no mammographic correlate, MRI guided biopsy is recommended 3. No suspicious LEFT breast enhancement. No axillay or internal mammary adenopathy.  RECOMMENDATION: Surgical/oncologic management is recommended and ongoing. Biopsy of the RIGHT breast is recommended. BIRADS-4: Suspicious   9/22/23 ([facility]) MRI guided biopsy x 2 sites Site A - 0.4cm focal NME of the RIGHT 6-7:00 1.3cmfn (cylinder clip) - DCIS, focal, solid pattern, intermediate nuclear grade; IDP Site B - anterior most aspect of the RIGHT 6:00 segmental 4cm NME (biopsy proven DCIS and LCIS) (buckle clip) - DCIS, focal, solid pattern, intermediate nuclear grade. Malignant, concordant. Surgical consultation is ongoing. ADDENDUM: The cylinder and buckle clips are amenable to elvis localization. The tophat clip from outside stereobiopsy is not amenable to elvis.   10/24/23 ([facility])  RIGHT LUMPECTOMY: DCIS, intermediate garde with necrosis, spanning at least 45mm, focally involving a radial scar. LCIS, classic type. Biopsy site changes. RIGHT BREAST LATERAL & SUPERIOR & INFERIOR & DEEP MARGINS: Bengin breast tissue RIGHT BREAST MEIAL MARGIN: DCIS present at the new inked margin RIGHT BREAST ANTERIOR MARGIN: Skin with dermal scar  12/12/23 ([facility])  RE-EXCISION OF RIGHT BREAST MEDIAL MARGIN: Residual DCIS, intermediate grade with necrosis and rare microcalcification, spans approximately 3.6cm.  NEW MARGIN DCIS focally involves inked new margin on one slide, <1mm in two slides. Tunica media vascular calcifications   2/27/24 ([facility]) Outside slide review surg path: Right breast lumpectomy re-excision medial margin: DCIS 3.6 cm intermediate grade, solid type with associated necrosis, present in 1 focus at inked margin and at biopsy site changes, <1mm in two slides, and vascular calcifications.  11/6/24 ([facility]) B/l dMMG/US- No mammographic or sonographic evidence of malignancy. Post lumpectomy changes in the right breast with marked distortion. BI-RADS 2  5/7/25 ([facility]) B/l MRI: heterogeneously dense. Right breast demonstrates postsurgical and adjuvant radiation changes. There are scattered enhancing nonspecific foci. There is no suspicious enhancement in the right breast. Due to the patient's history of prior surgical excision with positive margins, recommend continued MRI surveillance in 6 months. BI-RADS 3. F/u: MRI 6 mo.

## 2025-06-04 NOTE — ASSESSMENT
[FreeTextEntry1] : 79 year old female, patient of Dr. Hernandez, presents for follow up evaluation regarding R DCIS ER % CO 5%. Patient is s/p right breast lumpectomy on 10/24/23 at [facility], path revealed 4.5cm DCIS intermediate grade with necrosis, focally involving a radial scar; LCIS classic type, biopsy site changes; with positive medial margin (DCIS present at inked margin). Patient is s/p re-excision of medial margin 12/12/23 at [facility], path revealed 3.6cm residual DCIS, intermediate grade with necrosis and rare microcalcification; however DCIS focally involving inked new margin on one slide, <1 mm in two slides and tunica media vascular calcifications. Outside slide review complete, no change.  Currently taking Tamoxifen with med onc, Dr. Ordonez (initially on Arimidex however d/c d/t osteoporosis).  B/l dMMG/US 11/6/24, BI-RADS 2. B/l MRI 5/27/25 BI-RADS 3, postsurigcal and adjuvant radiation changes noted in the right breast -- given patient's history of prior surgical excision with positive margins, recommend continued MRI surveillance in 6 months.  Patient without complaint. Physical exam WNL. Most recent imaging reviewed, BI-RADS 3. Plan for B/L MRI and B/l sMMG/US and re-examination in 6 months. Patient verbalizes understanding and is in agreement with the plan.

## 2025-06-04 NOTE — HISTORY OF PRESENT ILLNESS
[FreeTextEntry1] : 79 year old female, patient of Dr. Hernandez, presents for follow up evaluation regarding R DCIS ER % UT 5%. Patient is s/p right breast lumpectomy on 10/24/23 at [facility], path revealed 4.5cm DCIS intermediate grade with necrosis, focally involving a radial scar; LCIS classic type, biopsy site changes; with positive medial margin (DCIS present at inked margin). Patient is s/p re-excision of medial margin 12/12/23 at [facility], path revealed 3.6cm residual DCIS, intermediate grade with necrosis and rare microcalcification; however DCIS focally involving inked new margin on one slide, <1 mm in two slides and tunica media vascular calcifications. Outside slide review complete, no change.  DCISionRT score 9.2. Patient s/p XRT (4/24/24) with rad onc, Dr. Hernandez. Currently taking Tamoxifen with med onc, Dr. Ordonez (initially on Arimidex however d/c d/t osteoporosis).  B/l dMMG/US 11/6/24, BI-RADS 2. B/l MRI 5/27/25 BI-RADS 3, postsurigcal and adjuvant radiation changes noted in the right breast -- given patient's history of prior surgical excision with positive margins, recommend continued MRI surveillance in 6 months.  Cancer Hx: First seen on screening mammogram, s/p stereotactic biopsy x 2 sites of R breast, followed by B/L MRI for extent of disease which prompted 2 more biopsies of the R breast. Summary of biopsies below:   8/15/23 - Stereotactic biopsy x 2 sites at [facility] Site A - RIGHT lower central with calcifications: DCIS; intermediate to high nuclear grade, solid and cribriform types; LCIS in situ, classic type; Calcification associated with carcinoma in situ and non-neoplastic tissue Site B - RIGHT lower central without calcifications: DCIS, similar to that in part "A"; portions of papilloma involved by DCIS  9/22/23 - MRI guided biopsy x 2 sites at [facility] Site A - 0.4cm focal NME of the RIGHT 6-7:00 1.3cmfn (cylinder clip) - DCIS, focal, solid pattern, intermediate nuclear grade; IDP Site B - anterior most aspect of the RIGHT 6:00 segmental 4cm NME (biopsy proven DCIS and LCIS) (buckle clip) - DCIS, focal, solid pattern, intermediate nuclear grade.  Patient denies family history of breast or ovarian cancer.

## 2025-06-04 NOTE — HISTORY OF PRESENT ILLNESS
[FreeTextEntry1] : 79 year old female, patient of Dr. Hernandez, presents for follow up evaluation regarding R DCIS ER % ID 5%. Patient is s/p right breast lumpectomy on 10/24/23 at [facility], path revealed 4.5cm DCIS intermediate grade with necrosis, focally involving a radial scar; LCIS classic type, biopsy site changes; with positive medial margin (DCIS present at inked margin). Patient is s/p re-excision of medial margin 12/12/23 at [facility], path revealed 3.6cm residual DCIS, intermediate grade with necrosis and rare microcalcification; however DCIS focally involving inked new margin on one slide, <1 mm in two slides and tunica media vascular calcifications. Outside slide review complete, no change.  DCISionRT score 9.2. Patient s/p XRT (4/24/24) with rad onc, Dr. Hernandez. Currently taking Tamoxifen with med onc, Dr. Ordonez (initially on Arimidex however d/c d/t osteoporosis).  B/l dMMG/US 11/6/24, BI-RADS 2. B/l MRI 5/27/25 BI-RADS 3, postsurigcal and adjuvant radiation changes noted in the right breast -- given patient's history of prior surgical excision with positive margins, recommend continued MRI surveillance in 6 months.  Cancer Hx: First seen on screening mammogram, s/p stereotactic biopsy x 2 sites of R breast, followed by B/L MRI for extent of disease which prompted 2 more biopsies of the R breast. Summary of biopsies below:   8/15/23 - Stereotactic biopsy x 2 sites at [facility] Site A - RIGHT lower central with calcifications: DCIS; intermediate to high nuclear grade, solid and cribriform types; LCIS in situ, classic type; Calcification associated with carcinoma in situ and non-neoplastic tissue Site B - RIGHT lower central without calcifications: DCIS, similar to that in part "A"; portions of papilloma involved by DCIS  9/22/23 - MRI guided biopsy x 2 sites at [facility] Site A - 0.4cm focal NME of the RIGHT 6-7:00 1.3cmfn (cylinder clip) - DCIS, focal, solid pattern, intermediate nuclear grade; IDP Site B - anterior most aspect of the RIGHT 6:00 segmental 4cm NME (biopsy proven DCIS and LCIS) (buckle clip) - DCIS, focal, solid pattern, intermediate nuclear grade.  Patient denies family history of breast or ovarian cancer.

## 2025-06-04 NOTE — PHYSICAL EXAM
[Supple] : supple [No Supraclavicular Adenopathy] : no supraclavicular adenopathy [Examined in the supine and seated position] : examined in the supine and seated position [No dominant masses] : no dominant masses in right breast  [No dominant masses] : no dominant masses left breast [No Nipple Retraction] : no left nipple retraction [No Nipple Discharge] : no left nipple discharge [No Axillary Lymphadenopathy] : no left axillary lymphadenopathy [de-identified] : well healed incisions; flattened nipple

## 2025-06-04 NOTE — PHYSICAL EXAM
[Supple] : supple [No Supraclavicular Adenopathy] : no supraclavicular adenopathy [Examined in the supine and seated position] : examined in the supine and seated position [No dominant masses] : no dominant masses in right breast  [No dominant masses] : no dominant masses left breast [No Nipple Retraction] : no left nipple retraction [No Nipple Discharge] : no left nipple discharge [No Axillary Lymphadenopathy] : no left axillary lymphadenopathy [de-identified] : well healed incisions; flattened nipple

## 2025-06-04 NOTE — DATA REVIEWED
[FreeTextEntry1] : 7/10/23 ([facility]) B/L sMMG/US: heterogeneously dense. There is an area of developing density/irregular mass with associated microcalcifications in the right breast 12-1:00 position approximately 6 cm deep to nipple. FOLLOW-UP: focal spot compression, ML view and directed breast ultrasound as no abnormality was identified at screening breast ultrasound in this region. BI-RADS 0:  Incomplete.  8/1/23 ([facility]) R DX MMG/US: heterogeneously dense. There is persistent right lower central middle breast focal asymmetry with associated coarse heterogeneous calcifications. Sonographically occult right breast focal asymmetry is indeterminate and tissue sampling is recommended. FOLLOW-UP: Stereotactic/tomosynthesis guided core biopsy of the right breast is recommended. BI-RADS 4:  Suspicious.  8/15/23 Stereotactic biopsy x 2 sites  ([facility])  Site A - RIGHT lower central with calcifications: DCIS; intermediate to high nuclear grade, solid and cribriform types; LCIS in situ, classic type; Calcification associated with carcinoma in situ and non neoplastic tissue Site B - RIGHT lower central without calcifications: DCIS, similar to that in part "A"; portions of papilloma involved by DCIS ER % OR 5% Malignant, concordant. RECOMMENDATION: Surgical consultation   9/11/23 ([facility]) B/L MRI:  1. RIGHT 6:00 segmental NME measuring up to 4cm in dimension, with the biopsy clip in the posterior and lateral portion of the suspicious enhancement. MRI guided biopsy is recommended of the anterior extent if it would change clinical management 2. Focal NME at RIGHT 6-7:00 anterior depth, 1.7cm anterior to the biopsy-proven DCIS, is suspicious for malignancy. As there is no mammographic correlate, MRI guided biopsy is recommended 3. No suspicious LEFT breast enhancement. No axillay or internal mammary adenopathy.  RECOMMENDATION: Surgical/oncologic management is recommended and ongoing. Biopsy of the RIGHT breast is recommended. BIRADS-4: Suspicious   9/22/23 ([facility]) MRI guided biopsy x 2 sites Site A - 0.4cm focal NME of the RIGHT 6-7:00 1.3cmfn (cylinder clip) - DCIS, focal, solid pattern, intermediate nuclear grade; IDP Site B - anterior most aspect of the RIGHT 6:00 segmental 4cm NME (biopsy proven DCIS and LCIS) (buckle clip) - DCIS, focal, solid pattern, intermediate nuclear grade. Malignant, concordant. Surgical consultation is ongoing. ADDENDUM: The cylinder and buckle clips are amenable to elvis localization. The tophat clip from outside stereobiopsy is not amenable to elvis.   10/24/23 ([facility])  RIGHT LUMPECTOMY: DCIS, intermediate garde with necrosis, spanning at least 45mm, focally involving a radial scar. LCIS, classic type. Biopsy site changes. RIGHT BREAST LATERAL & SUPERIOR & INFERIOR & DEEP MARGINS: Bengin breast tissue RIGHT BREAST MEIAL MARGIN: DCIS present at the new inked margin RIGHT BREAST ANTERIOR MARGIN: Skin with dermal scar  12/12/23 ([facility])  RE-EXCISION OF RIGHT BREAST MEDIAL MARGIN: Residual DCIS, intermediate grade with necrosis and rare microcalcification, spans approximately 3.6cm.  NEW MARGIN DCIS focally involves inked new margin on one slide, <1mm in two slides. Tunica media vascular calcifications   2/27/24 ([facility]) Outside slide review surg path: Right breast lumpectomy re-excision medial margin: DCIS 3.6 cm intermediate grade, solid type with associated necrosis, present in 1 focus at inked margin and at biopsy site changes, <1mm in two slides, and vascular calcifications.  11/6/24 ([facility]) B/l dMMG/US- No mammographic or sonographic evidence of malignancy. Post lumpectomy changes in the right breast with marked distortion. BI-RADS 2  5/7/25 ([facility]) B/l MRI: heterogeneously dense. Right breast demonstrates postsurgical and adjuvant radiation changes. There are scattered enhancing nonspecific foci. There is no suspicious enhancement in the right breast. Due to the patient's history of prior surgical excision with positive margins, recommend continued MRI surveillance in 6 months. BI-RADS 3. F/u: MRI 6 mo.